# Patient Record
(demographics unavailable — no encounter records)

---

## 2024-12-31 NOTE — COUNSELING
[Normal Weight - ( BMI  <25 )] : normal weight - ( BMI  <25 ) [Mediterranean diet recommended] : Mediterranean diet recommended [Non - Smoker] : non-smoker [Smoke/CO Detectors] : smoke/CO detectors [Use grab bars] : use grab bars [Use assistive device to avoid falls] : use assistive device to avoid falls [] : foot exam [Patient not on disease-modifying anti-rheumatic drug due to overall prognosis] : Patient not on disease-modifying anti-rheumatic drug due to overall prognosis [Completed] : Aspirin use discussion completed [Improve exercise tolerance] : improve exercise tolerance [Improve mobility] : improve mobility [Improve weight] : improve weight [Improve pain control] : improve pain control [Decrease stress] : decrease stress [Decrease hospital use] : decrease hospital use [Minimize unnecessary interventions] : minimize unnecessary interventions [Comfort Care] : comfort care [Maintain functional ability] : maintain functional ability [Discussed disease trajectory with patient/caregiver] : discussed disease trajectory with patient/caregiver [Patient/Caregiver is unclear of wishes] : patient/caregiver is unclear of wishes [Advanced Directives discussed: ____] : Advanced directives discussed: [unfilled] [Completed Healthcare Proxy] : completed healthcare proxy [Full Code] : Code Status: Full Code [Limited] : Treatment Guidelines: Limited [DNI] : Intubation: DNI [[facility]: _____] [facility]/last verification date: [unfilled] [_____] : HCP: [unfilled]

## 2024-12-31 NOTE — COUNSELING
[Normal Weight - ( BMI  <25 )] : normal weight - ( BMI  <25 ) [Mediterranean diet recommended] : Mediterranean diet recommended [Non - Smoker] : non-smoker [Smoke/CO Detectors] : smoke/CO detectors [Use grab bars] : use grab bars [Use assistive device to avoid falls] : use assistive device to avoid falls [] : foot exam [Patient not on disease-modifying anti-rheumatic drug due to overall prognosis] : Patient not on disease-modifying anti-rheumatic drug due to overall prognosis [Completed] : Aspirin use discussion completed [Improve exercise tolerance] : improve exercise tolerance [Improve mobility] : improve mobility [Improve weight] : improve weight [Improve pain control] : improve pain control [Decrease stress] : decrease stress [Decrease hospital use] : decrease hospital use [Minimize unnecessary interventions] : minimize unnecessary interventions [Comfort Care] : comfort care [Maintain functional ability] : maintain functional ability [Discussed disease trajectory with patient/caregiver] : discussed disease trajectory with patient/caregiver [Patient/Caregiver is unclear of wishes] : patient/caregiver is unclear of wishes [Advanced Directives discussed: ____] : Advanced directives discussed: [unfilled] [Completed Healthcare Proxy] : completed healthcare proxy [Full Code] : Code Status: Full Code [Limited] : Treatment Guidelines: Limited [DNI] : Intubation: DNI [[facility]: _____[facility]last verification date: [unfilled] [_____] : HCP: [unfilled]

## 2025-01-02 NOTE — PHYSICAL EXAM
[No Acute Distress] : no acute distress [Normal Sclera/Conjunctiva] : normal sclera/conjunctiva [EOMI] : extra ocular movement intact [Normal Outer Ear/Nose] : the ears and nose were normal in appearance [Normal Oropharynx] : the oropharynx was normal [No Respiratory Distress] : no respiratory distress [Clear to Auscultation] : lungs were clear to auscultation bilaterally [No Accessory Muscle Use] : no accessory muscle use [Normal Rate] : heart rate was normal  [Regular Rhythm] : with a regular rhythm [Normal S1, S2] : normal S1 and S2 [No Murmurs] : no murmurs heard [No Edema] : there was no peripheral edema [Normal Bowel Sounds] : normal bowel sounds [Non Tender] : non-tender [Soft] : abdomen soft [Not Distended] : not distended [Normal Post Cervical Nodes] : no posterior cervical lymphadenopathy [Normal Anterior Cervical Nodes] : no anterior cervical lymphadenopathy [No CVA Tenderness] : no ~M costovertebral angle tenderness [No Spinal Tenderness] : no spinal tenderness [Normal Strength/Tone] : muscle strength and tone were normal [No Rash] : no rash [Oriented x3] : oriented to person, place, and time [Normal Affect] : the affect was normal [de-identified] : fine crackles in lower lungs. [de-identified] : b/l legs edema 2+. [de-identified] : maribel akhtar.

## 2025-01-02 NOTE — PHYSICAL EXAM
[No Acute Distress] : no acute distress [Normal Sclera/Conjunctiva] : normal sclera/conjunctiva [EOMI] : extra ocular movement intact [Normal Outer Ear/Nose] : the ears and nose were normal in appearance [Normal Oropharynx] : the oropharynx was normal [No Respiratory Distress] : no respiratory distress [Clear to Auscultation] : lungs were clear to auscultation bilaterally [No Accessory Muscle Use] : no accessory muscle use [Normal Rate] : heart rate was normal  [Regular Rhythm] : with a regular rhythm [Normal S1, S2] : normal S1 and S2 [No Murmurs] : no murmurs heard [No Edema] : there was no peripheral edema [Normal Bowel Sounds] : normal bowel sounds [Non Tender] : non-tender [Soft] : abdomen soft [Not Distended] : not distended [Normal Post Cervical Nodes] : no posterior cervical lymphadenopathy [Normal Anterior Cervical Nodes] : no anterior cervical lymphadenopathy [No CVA Tenderness] : no ~M costovertebral angle tenderness [No Spinal Tenderness] : no spinal tenderness [Normal Strength/Tone] : muscle strength and tone were normal [No Rash] : no rash [Oriented x3] : oriented to person, place, and time [Normal Affect] : the affect was normal [de-identified] : fine crackles in lower lungs. [de-identified] : b/l legs edema 2+. [de-identified] : maribel akhtar.

## 2025-01-02 NOTE — REASON FOR VISIT
[Follow-Up] : a follow-up visit [Family Member] : family member [Formal Caregiver] : formal caregiver [Pre-Visit Preparation] : pre-visit preparation was done [FreeTextEntry2] : chart reviewed.

## 2025-01-02 NOTE — HEALTH RISK ASSESSMENT
[HRA Reviewed] : Health risk assessment reviewed [Independent] : feeding [Some assistance needed] : managing finances [Full assistance needed] : using transportation [Any fall with injury in past year] : Patient reported fall with injury in the past year [No] : The patient does not have visual impairment [TimeGetUpGo] : 10

## 2025-01-02 NOTE — HISTORY OF PRESENT ILLNESS
[House Calls Co-Management Patient] : [unfilled] is a House Calls co-management patient [A] : A [Patient is stable] : patient is stable [Education provided] : education provided [Medications adjusted] : medication adjusted [Patient] : patient [Spouse] : spouse [Family Member] : family member [Formal Caregiver] : formal caregiver [FreeTextEntry2] : PMH: dCHF. Hx of aortic valve repair in 2005. HTN. HLD. Sacral fracture 04/2024. Aortic calcification. GENNA.  The patient lives with supportive family and  in private house on ground floor. HHA 4 hrs x 7 days. The patient has 8 children.  Interval Events: - The patient was in [facility] on 04/2024 for s/p fall, Sacral fracture.   Medical Issues:  - S/p Sacral fracture: Tylenol 650 mg 1 tab Q6hrs PRN, Tramadol 50 mg 1 tab BID PRN. PT is in place. Follow up with ortho. - dCHF/Hx of aortic valve repair in 2005/HTN: aspirin 81 mg daily, Carvedilol 12.5 mg 1 tab BID, Lasix 20 mg 1 tab daily, Spironolactone 25 mg 1 tab daily. Follow up with cardiologist. - HLD: Rosuvastatin 40 mg 1 tab at bedtime. Will monitor. - Aortic calcification: CT 04/23/24. - GENNA: eGFR 37, BUN 28, Creatinine 1.41. Will monitor. - Vitamin D3 deficiency: Vit D 50 000 U 1 cap weekly. Will monitor.       Specialists:  PCP Dr. Tejal Lewis tel [phone] fax [phone]. Cardiologist Dr. Dax Dominguez. Orthopedic Dr. Kalia العراقي.     Social hx: never smoker.  Caregivers: Pancho Ackreman [phone].   Subjective: -Appetite/weight: appetite is not very good. Weight is stable. -Gait/falls: Gait is unstable with rollator. Falls with pelvic fracture on 04/2024. -Pain: L thigh pain, s/p fx.  -Sleep: sleeps wells. -BMs: regular no straining. -Urine: no issue. -Skin: intact -DME: walker. -Mood/memory: mood is good. Short memory is failed. -Communication: conversational, pleasant. minimal medical literacy -Health Maintenance: vaccinated up to date. Colonoscopy never. -Hospitalizations in the past year: once.  MOLST:  Discussion of advance care planning. Partially completed. Total time spent: 35 min. Participants: patient, Son Tyron, RN Idania Juan, NP Veda Link, Discussion: Goals of care, Advanced directives, Health care agency, and Disease trajectory. Completed MOLST form: DNR, DNI, DNH, artificial feeding, determine use of antibiotics, no Dialysis. Please see Advanced Care Planning order. Goal of care: primary focus for patient and family is comfort, patient would like to stay home for the days that remain for pt, also understanding some of the limitations to what we can supply at home in order for pt to meet the need to be comfortable. HCA: Pancho Ackerman is a Proxy. Disease Trajectory: Discussed and reviewed that patient will not likely functionally improve and will likely become more dependent on ADLs over time. Hospice Benefit also discussed as a potential benefit to patient in future once meeting criteria. Prognosis: not fully discussed at this time with patient and family, will discuss at our next visit a time line of prognosis.

## 2025-01-02 NOTE — HISTORY OF PRESENT ILLNESS
[House Calls Co-Management Patient] : [unfilled] is a House Calls co-management patient [A] : A [Patient is stable] : patient is stable [Education provided] : education provided [Medications adjusted] : medication adjusted [Patient] : patient [Spouse] : spouse [Family Member] : family member [Formal Caregiver] : formal caregiver [FreeTextEntry2] : PMH: dCHF. Hx of aortic valve repair in 2005. HTN. HLD. Sacral fracture 04/2024. Aortic calcification. GENNA.  The patient lives with supportive family and  in private house on ground floor. HHA 4 hrs x 7 days. The patient has 8 children.  Interval Events: - The patient was in [facility] on 04/2024 for s/p fall, Sacral fracture.   Medical Issues:  - S/p Sacral fracture: Tylenol 650 mg 1 tab Q6hrs PRN, Tramadol 50 mg 1 tab BID PRN. PT is in place. Follow up with ortho. - dCHF/Hx of aortic valve repair in 2005/HTN: aspirin 81 mg daily, Carvedilol 12.5 mg 1 tab BID, Lasix 20 mg 1 tab daily, Spironolactone 25 mg 1 tab daily. Follow up with cardiologist. - HLD: Rosuvastatin 40 mg 1 tab at bedtime. Will monitor. - Aortic calcification: CT 04/23/24. - GENNA: eGFR 37, BUN 28, Creatinine 1.41. Will monitor. - Vitamin D3 deficiency: Vit D 50 000 U 1 cap weekly. Will monitor.       Specialists:  PCP Dr. Tejal Lewis tel [phone] fax [phone]. Cardiologist Dr. Dax Dominguez. Orthopedic Dr. Kalia العراقي.     Social hx: never smoker.  Caregivers: Pancho Ackerman [phone].   Subjective: -Appetite/weight: appetite is not very good. Weight is stable. -Gait/falls: Gait is unstable with rollator. Falls with pelvic fracture on 04/2024. -Pain: L thigh pain, s/p fx.  -Sleep: sleeps wells. -BMs: regular no straining. -Urine: no issue. -Skin: intact -DME: walker. -Mood/memory: mood is good. Short memory is failed. -Communication: conversational, pleasant. minimal medical literacy -Health Maintenance: vaccinated up to date. Colonoscopy never. -Hospitalizations in the past year: once.  MOLST:  Discussion of advance care planning. Partially completed. Total time spent: 35 min. Participants: patient, Son Tyron, RN Idania Juan, NP Veda Link, Discussion: Goals of care, Advanced directives, Health care agency, and Disease trajectory. Completed MOLST form: DNR, DNI, DNH, artificial feeding, determine use of antibiotics, no Dialysis. Please see Advanced Care Planning order. Goal of care: primary focus for patient and family is comfort, patient would like to stay home for the days that remain for pt, also understanding some of the limitations to what we can supply at home in order for pt to meet the need to be comfortable. HCA: Pancho Ackerman is a Proxy. Disease Trajectory: Discussed and reviewed that patient will not likely functionally improve and will likely become more dependent on ADLs over time. Hospice Benefit also discussed as a potential benefit to patient in future once meeting criteria. Prognosis: not fully discussed at this time with patient and family, will discuss at our next visit a time line of prognosis.

## 2025-06-19 NOTE — COUNSELING
[Normal Weight - ( BMI  <25 )] : normal weight - ( BMI  <25 ) [Mediterranean diet recommended] : Mediterranean diet recommended [Non - Smoker] : non-smoker [Smoke/CO Detectors] : smoke/CO detectors [Use grab bars] : use grab bars [Use assistive device to avoid falls] : use assistive device to avoid falls [] : foot exam [Patient not on disease-modifying anti-rheumatic drug due to overall prognosis] : Patient not on disease-modifying anti-rheumatic drug due to overall prognosis [Completed] : Aspirin use discussion completed [Improve exercise tolerance] : improve exercise tolerance [Improve mobility] : improve mobility [Improve weight] : improve weight [Improve pain control] : improve pain control [Decrease stress] : decrease stress [Decrease hospital use] : decrease hospital use [Minimize unnecessary interventions] : minimize unnecessary interventions [Comfort Care] : comfort care [Maintain functional ability] : maintain functional ability [Discussed disease trajectory with patient/caregiver] : discussed disease trajectory with patient/caregiver [Patient/Caregiver is unclear of wishes] : patient/caregiver is unclear of wishes [Advanced Directives discussed: ____] : Advanced directives discussed: [unfilled] [Completed Healthcare Proxy] : completed healthcare proxy [Full Code] : Code Status: Full Code [Limited] : Treatment Guidelines: Limited [DNI] : Intubation: DNI [[facility]: _____[facility]/last verification date: [unfilled] [_____] : HCP: [unfilled]

## 2025-06-20 NOTE — HISTORY OF PRESENT ILLNESS
[House Calls Co-Management Patient] : [unfilled] is a House Calls co-management patient [A] : A [Patient is stable] : patient is stable [Education provided] : education provided [Medications adjusted] : medication adjusted [Patient] : patient [Spouse] : spouse [Family Member] : family member [Formal Caregiver] : formal caregiver [FreeTextEntry2] : PMH: dCHF. Hx of aortic valve repair in 2005. HTN. HLD. Sacral fracture 04/2024. Aortic calcification. GENNA.  The patient lives with supportive family and  in private house on ground floor. HHA 4 hrs x 7 days. The patient has 8 children.  Interval Events: - The patient was in [facility] on 04/2024 for s/p fall, Sacral fracture.   Medical Issues:  - S/p Sacral fracture: Tylenol 650 mg 1 tab Q6hrs PRN, Tramadol 50 mg 1 tab BID PRN. PT is in place. Follow up with ortho. - dCHF/Hx of aortic valve repair in 2005/HTN: aspirin 81 mg daily, Carvedilol 12.5 mg 1 tab BID, Lasix 20 mg 1 tab daily, Spironolactone 25 mg 1 tab daily. Follow up with cardiologist. - HLD: Rosuvastatin 40 mg 1 tab at bedtime. Will monitor. - Aortic calcification: CT 04/23/24. - GENNA: eGFR 37, BUN 28, Creatinine 1.41. Will monitor. - Vitamin D3 deficiency: Vit D 50 000 U 1 cap weekly. Will monitor.       Specialists:  PCP Dr. Tejal Lewis tel [phone] fax [phone]. Cardiologist Dr. Dax Dominguez. Orthopedic Dr. Kalia العراقي.     Social hx: never smoker.  Caregivers: Pancho Ackerman [phone].   Subjective: -Appetite/weight: appetite is not very good. Weight is stable. -Gait/falls: Gait is unstable with rollator. Falls with pelvic fracture on 04/2024. -Pain: L thigh pain, s/p fx.  -Sleep: sleeps wells. -BMs: regular no straining. -Urine: no issue. -Skin: intact -DME: walker. -Mood/memory: mood is good. Short memory is failed. -Communication: conversational, pleasant. minimal medical literacy -Health Maintenance: vaccinated up to date. Colonoscopy never. -Hospitalizations in the past year: once.  MOLST:  Discussion of advance care planning. Partially completed. Total time spent: 35 min. Participants: patient, Son Tyron, RN Idania Juan, NP Veda Link, Discussion: Goals of care, Advanced directives, Health care agency, and Disease trajectory. Completed MOLST form: DNR, DNI, DNH, artificial feeding, determine use of antibiotics, no Dialysis. Please see Advanced Care Planning order. Goal of care: primary focus for patient and family is comfort, patient would like to stay home for the days that remain for pt, also understanding some of the limitations to what we can supply at home in order for pt to meet the need to be comfortable. HCA: Pancho Ackerman is a Proxy. Disease Trajectory: Discussed and reviewed that patient will not likely functionally improve and will likely become more dependent on ADLs over time. Hospice Benefit also discussed as a potential benefit to patient in future once meeting criteria. Prognosis: not fully discussed at this time with patient and family, will discuss at our next visit a time line of prognosis.

## 2025-06-20 NOTE — PHYSICAL EXAM
[No Acute Distress] : no acute distress [Normal Sclera/Conjunctiva] : normal sclera/conjunctiva [EOMI] : extra ocular movement intact [Normal Outer Ear/Nose] : the ears and nose were normal in appearance [Normal Oropharynx] : the oropharynx was normal [No Respiratory Distress] : no respiratory distress [Clear to Auscultation] : lungs were clear to auscultation bilaterally [No Accessory Muscle Use] : no accessory muscle use [Normal Rate] : heart rate was normal  [Regular Rhythm] : with a regular rhythm [Normal S1, S2] : normal S1 and S2 [No Murmurs] : no murmurs heard [No Edema] : there was no peripheral edema [Normal Bowel Sounds] : normal bowel sounds [Non Tender] : non-tender [Soft] : abdomen soft [Not Distended] : not distended [Normal Post Cervical Nodes] : no posterior cervical lymphadenopathy [Normal Anterior Cervical Nodes] : no anterior cervical lymphadenopathy [No CVA Tenderness] : no ~M costovertebral angle tenderness [No Spinal Tenderness] : no spinal tenderness [Normal Strength/Tone] : muscle strength and tone were normal [No Rash] : no rash [Oriented x3] : oriented to person, place, and time [Normal Affect] : the affect was normal [de-identified] : fine crackles in lower lungs. [de-identified] : b/l legs edema 2+. [de-identified] : maribel akhtar.